# Patient Record
Sex: MALE | Race: WHITE | NOT HISPANIC OR LATINO | URBAN - METROPOLITAN AREA
[De-identification: names, ages, dates, MRNs, and addresses within clinical notes are randomized per-mention and may not be internally consistent; named-entity substitution may affect disease eponyms.]

---

## 2022-06-20 ENCOUNTER — OFFICE VISIT (OUTPATIENT)
Dept: FAMILY MEDICINE CLINIC | Facility: CLINIC | Age: 47
End: 2022-06-20

## 2022-06-20 VITALS
WEIGHT: 259 LBS | SYSTOLIC BLOOD PRESSURE: 162 MMHG | DIASTOLIC BLOOD PRESSURE: 88 MMHG | HEART RATE: 109 BPM | BODY MASS INDEX: 36.26 KG/M2 | HEIGHT: 71 IN

## 2022-06-20 DIAGNOSIS — Z02.89 ENCOUNTER FOR FEDERAL AVIATION ADMINISTRATION (FAA) EXAMINATION: Primary | ICD-10-CM

## 2022-06-20 PROCEDURE — 99499 UNLISTED E&M SERVICE: CPT | Performed by: FAMILY MEDICINE

## 2023-06-27 ENCOUNTER — OFFICE VISIT (OUTPATIENT)
Dept: FAMILY MEDICINE CLINIC | Facility: CLINIC | Age: 48
End: 2023-06-27

## 2023-06-27 VITALS — HEIGHT: 71 IN | BODY MASS INDEX: 36.68 KG/M2 | WEIGHT: 262 LBS

## 2023-06-27 DIAGNOSIS — Z02.89 ENCOUNTER FOR FEDERAL AVIATION ADMINISTRATION (FAA) EXAMINATION: Primary | ICD-10-CM

## 2023-06-27 PROCEDURE — 99499 UNLISTED E&M SERVICE: CPT | Performed by: FAMILY MEDICINE

## 2023-06-27 NOTE — PROGRESS NOTES
Chief Complaint   Patient presents with   • Physical Exam     Faa 2nd class, no ekg, no correctives, no letter

## 2024-06-18 ENCOUNTER — OFFICE VISIT (OUTPATIENT)
Dept: FAMILY MEDICINE CLINIC | Facility: CLINIC | Age: 49
End: 2024-06-18

## 2024-06-18 VITALS
HEART RATE: 98 BPM | DIASTOLIC BLOOD PRESSURE: 80 MMHG | SYSTOLIC BLOOD PRESSURE: 138 MMHG | WEIGHT: 271 LBS | HEIGHT: 70 IN | BODY MASS INDEX: 38.8 KG/M2

## 2024-06-18 DIAGNOSIS — Z02.89 ENCOUNTER FOR FEDERAL AVIATION ADMINISTRATION (FAA) EXAMINATION: Primary | ICD-10-CM

## 2024-06-18 PROCEDURE — 99499 UNLISTED E&M SERVICE: CPT | Performed by: FAMILY MEDICINE

## 2024-06-18 RX ORDER — ROSUVASTATIN CALCIUM 20 MG/1
20 TABLET, COATED ORAL
COMMUNITY
Start: 2024-02-02

## 2024-06-18 RX ORDER — FLUTICASONE PROPIONATE 50 MCG
SPRAY, SUSPENSION (ML) NASAL
COMMUNITY
Start: 2024-02-01 | End: 2025-02-01

## 2024-06-18 RX ORDER — CANDESARTAN 16 MG/1
TABLET ORAL
COMMUNITY
Start: 2024-01-12 | End: 2025-01-12

## 2024-06-18 RX ORDER — LISINOPRIL 20 MG/1
TABLET ORAL
COMMUNITY

## 2024-06-18 RX ORDER — ZOLPIDEM TARTRATE 10 MG/1
1 TABLET ORAL
COMMUNITY
Start: 2024-01-12 | End: 2024-08-03

## 2024-06-18 RX ORDER — ROSUVASTATIN CALCIUM 20 MG/1
TABLET, COATED ORAL
COMMUNITY
Start: 2024-05-17 | End: 2025-02-02

## 2024-06-18 RX ORDER — METFORMIN HYDROCHLORIDE 500 MG/1
TABLET, EXTENDED RELEASE ORAL
COMMUNITY
Start: 2024-02-02 | End: 2025-02-02